# Patient Record
Sex: FEMALE | Race: OTHER | HISPANIC OR LATINO
[De-identification: names, ages, dates, MRNs, and addresses within clinical notes are randomized per-mention and may not be internally consistent; named-entity substitution may affect disease eponyms.]

---

## 2018-08-23 ENCOUNTER — APPOINTMENT (OUTPATIENT)
Dept: UROLOGY | Facility: CLINIC | Age: 83
End: 2018-08-23
Payer: MEDICARE

## 2018-08-23 VITALS
DIASTOLIC BLOOD PRESSURE: 70 MMHG | WEIGHT: 155 LBS | HEIGHT: 59 IN | TEMPERATURE: 97.6 F | SYSTOLIC BLOOD PRESSURE: 138 MMHG | BODY MASS INDEX: 31.25 KG/M2 | HEART RATE: 69 BPM | OXYGEN SATURATION: 98 % | RESPIRATION RATE: 16 BRPM

## 2018-08-23 DIAGNOSIS — N39.46 MIXED INCONTINENCE: ICD-10-CM

## 2018-08-23 DIAGNOSIS — N95.2 POSTMENOPAUSAL ATROPHIC VAGINITIS: ICD-10-CM

## 2018-08-23 DIAGNOSIS — Z80.0 FAMILY HISTORY OF MALIGNANT NEOPLASM OF DIGESTIVE ORGANS: ICD-10-CM

## 2018-08-23 PROCEDURE — 99203 OFFICE O/P NEW LOW 30 MIN: CPT | Mod: 25

## 2018-08-23 PROCEDURE — 51798 US URINE CAPACITY MEASURE: CPT

## 2018-08-27 ENCOUNTER — RX CHANGE (OUTPATIENT)
Age: 83
End: 2018-08-27

## 2018-08-27 LAB
APPEARANCE: CLEAR
BACTERIA UR CULT: NORMAL
BACTERIA: NEGATIVE
BILIRUBIN URINE: NEGATIVE
BLOOD URINE: NEGATIVE
COLOR: YELLOW
GLUCOSE QUALITATIVE U: NEGATIVE MG/DL
HYALINE CASTS: 0 /LPF
KETONES URINE: NEGATIVE
LEUKOCYTE ESTERASE URINE: ABNORMAL
MICROSCOPIC-UA: NORMAL
NITRITE URINE: NEGATIVE
PH URINE: 7
PROTEIN URINE: NEGATIVE MG/DL
RED BLOOD CELLS URINE: 2 /HPF
SPECIFIC GRAVITY URINE: 1.01
SQUAMOUS EPITHELIAL CELLS: 0 /HPF
UROBILINOGEN URINE: NEGATIVE MG/DL
WHITE BLOOD CELLS URINE: 0 /HPF

## 2018-08-31 RX ORDER — ESTRADIOL 10 UG/1
10 TABLET VAGINAL
Qty: 8 | Refills: 3 | Status: ACTIVE | COMMUNITY
Start: 2018-08-31 | End: 1900-01-01

## 2018-08-31 RX ORDER — ESTRADIOL 0.1 MG/G
0.1 CREAM VAGINAL
Qty: 1 | Refills: 3 | Status: DISCONTINUED | COMMUNITY
Start: 2018-08-23 | End: 2018-08-31

## 2018-11-26 ENCOUNTER — APPOINTMENT (OUTPATIENT)
Age: 83
End: 2018-11-26

## 2019-04-23 ENCOUNTER — EMERGENCY (EMERGENCY)
Facility: HOSPITAL | Age: 84
LOS: 1 days | Discharge: HOME | End: 2019-04-23
Attending: EMERGENCY MEDICINE
Payer: MEDICARE

## 2019-04-23 VITALS
RESPIRATION RATE: 16 BRPM | DIASTOLIC BLOOD PRESSURE: 85 MMHG | HEART RATE: 72 BPM | SYSTOLIC BLOOD PRESSURE: 144 MMHG | TEMPERATURE: 98 F | WEIGHT: 160.06 LBS | HEIGHT: 61 IN | OXYGEN SATURATION: 96 %

## 2019-04-23 PROCEDURE — 73110 X-RAY EXAM OF WRIST: CPT

## 2019-04-23 PROCEDURE — 73110 X-RAY EXAM OF WRIST: CPT | Mod: 26,LT

## 2019-04-23 PROCEDURE — 29125 APPL SHORT ARM SPLINT STATIC: CPT | Mod: LT

## 2019-04-23 PROCEDURE — 99283 EMERGENCY DEPT VISIT LOW MDM: CPT | Mod: 25

## 2019-04-23 PROCEDURE — 99283 EMERGENCY DEPT VISIT LOW MDM: CPT

## 2019-04-23 PROCEDURE — 29125 APPL SHORT ARM SPLINT STATIC: CPT

## 2019-04-23 RX ORDER — ACETAMINOPHEN 500 MG
650 TABLET ORAL ONCE
Qty: 0 | Refills: 0 | Status: COMPLETED | OUTPATIENT
Start: 2019-04-23 | End: 2019-04-23

## 2019-04-23 RX ADMIN — Medication 650 MILLIGRAM(S): at 21:29

## 2019-04-23 RX ADMIN — Medication 650 MILLIGRAM(S): at 22:36

## 2019-04-23 NOTE — ED PROVIDER NOTE - CLINICAL SUMMARY MEDICAL DECISION MAKING FREE TEXT BOX
Neurovascular intact. High suspicion of wrist fracture. X-ray, Tylenol and reassess. Neurovascular intact. Suspicion of wrist fracture. X-ray, Tylenol and reassess.

## 2019-04-23 NOTE — ED PROVIDER NOTE - ATTENDING CONTRIBUTION TO CARE
Agree with NP H&P.  86 y/o female presents to ED c/o s/p fall x today. Pt with fall on outstretched hand and now with left wrist  pain.  Will get x-rays analgesia, and reassess.
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
## 2019-04-23 NOTE — ED PROVIDER NOTE - UPPER EXTREMITY EXAM, LEFT
Left wrist: distal radial and ulnar tenderness, slight swelling, slight deformity, no step off. Ulnar pulses 2+. Left elbow/shoulder: full ROM. full range of motion on all fingers cap refill less than 2 seconds.

## 2019-04-23 NOTE — ED PROVIDER NOTE - PROGRESS NOTE DETAILS
Xrays negative. Discussed with patient and son about official xray review by radiologist at a later time and if abnormal will call back. L wrist brace applied. History and findings suggestive of wrist sprain,. Will need to follow up with PMD in 3-5 days. Pt is well appearing walking with steady gait, stable for discharge and follow up without fail with medical doctor. I had a detailed discussion with the patient and/or guardian regarding the historical points, exam findings, and any diagnostic results supporting the discharge diagnosis. Pt educated on care and need for follow up. Strict return instructions and red flag signs and symptoms discussed with patient. Questions answered. Pt shows understanding of discharge information and agrees to follow.

## 2019-04-23 NOTE — ED PROVIDER NOTE - OBJECTIVE STATEMENT
84 y/o F with a PMHx of Hypothyroidism and no PSHx presents to ED c/o s/p fall x today. Pt reports playing with son when she tried to catch the ball, loss balance, fell forward tried to break her fall with left hand but landed on buttocks. Sustained moderate to severe local left wrist pain. Pain is worse with movement of fingers. No alleviating factors. Denies forearm/shoulder/neck/back pain. Denies numbness/tingling or focal weakness. NKDA. 86 y/o F with a PMHx of Hypothyroidism and no PSHx presents to ED c/o s/p fall x today. Pt reports playing with son when she tried to catch the ball, loss balance, fell backwards tried to break her fall with left hand but landed on buttocks. Sustained moderate to severe local left wrist pain. Pain is worse with movement of fingers. No alleviating factors. Denies forearm/shoulder/neck/back pain. Denies numbness/tingling or focal weakness. NKDA.

## 2019-04-26 NOTE — ED POST DISCHARGE NOTE - RESULT SUMMARY
? avulsion fracture.  pt called and informed of results.  pt appropriately splinted.  Pt referred to ortho. will  mail results ot home as requested.